# Patient Record
Sex: FEMALE | Race: WHITE | NOT HISPANIC OR LATINO | URBAN - METROPOLITAN AREA
[De-identification: names, ages, dates, MRNs, and addresses within clinical notes are randomized per-mention and may not be internally consistent; named-entity substitution may affect disease eponyms.]

---

## 2018-10-09 ENCOUNTER — NEW PATIENT (OUTPATIENT)
Dept: URBAN - METROPOLITAN AREA CLINIC 60 | Facility: CLINIC | Age: 58
End: 2018-10-09
Payer: COMMERCIAL

## 2018-10-09 DIAGNOSIS — H52.13 MYOPIA, BILATERAL: ICD-10-CM

## 2018-10-09 DIAGNOSIS — H25.13 AGE-RELATED NUCLEAR CATARACT, BILATERAL: Primary | ICD-10-CM

## 2018-10-09 PROCEDURE — 92004 COMPRE OPH EXAM NEW PT 1/>: CPT | Performed by: OPTOMETRIST

## 2018-10-09 ASSESSMENT — VISUAL ACUITY
OD: 20/20
OS: 20/20

## 2018-10-09 ASSESSMENT — INTRAOCULAR PRESSURE
OS: 11
OD: 10

## 2023-02-08 ENCOUNTER — OFFICE VISIT (OUTPATIENT)
Dept: URBAN - METROPOLITAN AREA CLINIC 60 | Facility: CLINIC | Age: 63
End: 2023-02-08
Payer: COMMERCIAL

## 2023-02-08 DIAGNOSIS — H35.61 RETINAL HEMORRHAGE, RIGHT EYE: ICD-10-CM

## 2023-02-08 DIAGNOSIS — H25.13 AGE-RELATED NUCLEAR CATARACT, BILATERAL: ICD-10-CM

## 2023-02-08 DIAGNOSIS — E11.9 TYPE 2 DIABETES MELLITUS W/O COMPLICATION: Primary | ICD-10-CM

## 2023-02-08 PROCEDURE — 92250 FUNDUS PHOTOGRAPHY W/I&R: CPT | Performed by: OPTOMETRIST

## 2023-02-08 PROCEDURE — 99204 OFFICE O/P NEW MOD 45 MIN: CPT | Performed by: OPTOMETRIST

## 2023-02-08 ASSESSMENT — INTRAOCULAR PRESSURE
OS: 17
OD: 16

## 2023-02-08 NOTE — IMPRESSION/PLAN
Impression: Type 2 diabetes mellitus w/o complication: B64.5. Plan: No diabetic retinopathy. Recommend yearly diabetic eye exam. Discussed with patient importance of good blood sugar control with regular visits with PCP. Photo documentation today.